# Patient Record
Sex: MALE | Race: WHITE
[De-identification: names, ages, dates, MRNs, and addresses within clinical notes are randomized per-mention and may not be internally consistent; named-entity substitution may affect disease eponyms.]

---

## 2017-11-07 VITALS — SYSTOLIC BLOOD PRESSURE: 133 MMHG | DIASTOLIC BLOOD PRESSURE: 75 MMHG

## 2017-11-07 NOTE — RAD
EXAM: Chest 2 views.



HISTORY: Chest pain, cough, congestion.



COMPARISON: None.



FINDINGS: Frontal and lateral views of the chest are obtained.    



A linear opacity in the right base most likely indicates atelectasis. The

inspiration is somewhat small. There is no pneumothorax or pleural effusion.

The heart is not enlarged.    



IMPRESSION:

1. Right basilar atelectasis. No confluent infiltrates.

## 2017-11-07 NOTE — PHYS DOC
Adult General


Chief Complaint


Chief Complaint:  COUGH





HPI


HPI





Patient is a 52 year old M who presents with cough, nasal congestion and 

shortness of breath.  Dwight states that he has had nasal congestion over the 

past 10 days and cough associated with shortness of breath over the past 3-4 

days. He states his cough is productive. He feels that his shortness of breath 

is worse with activity and improved with rest. He has associated chest pain 

with cough. His no chest pain without cough. He does have a history of 

pneumonia in his 20s. He denies any other associated symptoms.





Review of Systems


Review of Systems





Constitutional: Denies fever or chills []


Eyes: Denies change in visual acuity, redness, or eye pain []


HENT: Denies nasal congestion or sore throat []


Respiratory: Negative except history of present illness


Cardiovascular: No additional information not addressed in HPI []


GI: Denies abdominal pain, nausea, vomiting, bloody stools or diarrhea []


: Denies dysuria or hematuria []


Musculoskeletal: Denies back pain or joint pain []


Integument: Denies rash or skin lesions []


Neurologic: Denies headache, focal weakness or sensory changes []


Endocrine: Denies polyuria or polydipsia []





Family History


Family History


Noncontributory





Current Medications


Current Medications





Current Medications








 Medications


  (Trade)  Dose


 Ordered  Sig/Richardson  Start Time


 Stop Time Status Last Admin


Dose Admin


 


 Albuterol/


 Ipratropium


  (Duoneb)  3 ml  1X  ONCE  11/7/17 03:15


 11/7/17 03:16 UNV  


 


 


 Amoxicillin


  (Amoxil)  1,000 mg  1X  ONCE  11/7/17 03:15


 11/7/17 03:16 UNV  


 


 


 Budesonide


  (Pulmicort)  0.5 mg  1X  ONCE  11/7/17 03:15


 11/7/17 03:16 UNV  


 











Allergies


Allergies





Allergies








Coded Allergies Type Severity Reaction Last Updated Verified


 


  codeine Allergy Mild  11/7/17 Yes











Physical Exam


Physical Exam





Constitutional: Well developed, well nourished, no acute distress, non-toxic 

appearance. []


HENT: Normocephalic, atraumatic, bilateral external ears normal, oropharynx 

moist, no oral exudates, nose normal. []


Eyes: PERRLA, EOMI, conjunctiva normal, no discharge. [] 


Neck: Normal range of motion, no tenderness, supple, no stridor. [] 


Cardiovascular:Heart rate regular rhythm, no murmur []


Lungs & Thorax:  Bilateral breath sounds clear to auscultation [] diminished 

breath sounds bilaterally


Abdomen: Bowel sounds normal, soft, no tenderness, no masses, no pulsatile 

masses. [] 


Skin: Warm, dry, no erythema, no rash. [] 


Back: No tenderness, no CVA tenderness. [] 


Extremities: No tenderness, no cyanosis, no clubbing, ROM intact, no edema. [] 


Neurologic: Alert and oriented X 3, normal motor function, normal sensory 

function, no focal deficits noted. []


Psychologic: Affect normal, judgement normal, mood normal. []





Current Patient Data


Vital Signs


Normal vital signs. Please review nursing documentation for specifics





EKG


EKG


[]





Radiology/Procedures


Radiology/Procedures


Chest x-ray


Impressions:


Right lower lobe infiltrate





Course & Med Decision Making


Course & Med Decision Making


Pertinent Labs and Imaging studies reviewed. (See chart for details)





[]





Dragon Disclaimer


Dragon Disclaimer


This chart was dictated in whole or in part using Voice Recognition software in 

a busy, high-work load, and often noisy Emergency Department environment.  It 

may contain unintended and wholly unrecognized errors or omissions.





Departure


Departure:


Impression:  


 Primary Impression:  


 Pneumonia


 Additional Impressions:  


 Bronchitis


 Upper respiratory infection


Disposition:  01 HOME, SELF-CARE


Condition:  STABLE


Referrals:  


AIME OWENS MD (PCP)


Patient Instructions:  Acute Bronchitis, Pneumonia, Adult, Upper Respiratory 

Infection, Adult





Additional Instructions:  


Huber was seen in the emergency department for cough and congestion. No 

emergency medical condition was found on history or physical exam. He did have 

a chest x-ray with findings of pneumonia for which she was started on 

antibiotics. He was also found to have symptoms of an upper respiratory 

infection and bronchitis. He was strongly advised to consider regular nasal 

saline rinses.  He was given scripts for nasal sprays and inhaled steroids.  He 

was also given a script for albuterol to be used as needed for shortness of 

breath and/or coughing spells.  He was advised to follow up with his primary 

care doctor in the next 3-5days for further management.


Scripts


Ipratropium Bromide (IPRATROPIUM BROMIDE) 15 Ml [place]


15 ML NS TID for 7 Days, SPRAY


   Prov: SAIDA MTZ MD         11/7/17 


Fluticasone Propionate (FLOVENT 110MCG HFA) 12 Gm Aer.w.adap


2 PUFF IH BID for 7 Days, #1 INHALER 2 Refills


   Prov: SAIDA MTZ MD         11/7/17 


Albuterol Sulfate (PROAIR HFA INHALER) 8.5 Gm Hfa.aer.ad


1 PUFF INH PRN Q6HRS Y for SHORTNESS OF BREATH for 7 Days, INHALER 0 Refills


   Prov: SAIDA MTZ MD         11/7/17 


Amoxicillin (AMOXICILLIN) 500 Mg Capsule


1 CAP PO TID, #30 CAP


   Prov: SAIDA MTZ MD         11/7/17





Problem Qualifiers








 Primary Impression:  


 Pneumonia


 Pneumonia type:  due to unspecified organism  Laterality:  right  Lung location

:  lower lobe of lung  Qualified Codes:  J18.1 - Lobar pneumonia, unspecified 

organism


 Additional Impressions:  


 Upper respiratory infection


 URI type:  unspecified URI  Qualified Codes:  J06.9 - Acute upper respiratory 

infection, unspecified








SAIDA MTZ MD Nov 7, 2017 04:11

## 2017-11-07 NOTE — EKG
[facility] [address], [place] [address]

Test Date:    2017               Test Time:    02:30:00

Pat Name:     YASH SALDANA            Department:   

Patient ID:   [id number]           Room:          

Gender:       M                        Technician:   PATRICE

:          [id number]               Requested By: SAIDA MTZ

Order Number: [id number]            Reading MD:     

                                 Measurements

Intervals                              Axis          

Rate:         89                       P:            36

FL:           148                      QRS:          -13

QRSD:         94                       T:            24

QT:           358                                    

QTc:          437                                    

                           Interpretive Statements

SINUS RHYTHM

LEFTWARD AXIS

R-S TRANSITION ZONE IN V LEADS DISPLACED TO THE LEFT

INCOMPLETE RIGHT BUNDLE BRANCH BLOCK

NO SPECIFIC ECG ABNORMALITIES

RI6.01

No previous ECG available for comparison

## 2021-01-04 ENCOUNTER — HOSPITAL ENCOUNTER (OUTPATIENT)
Dept: HOSPITAL 63 - LAB | Age: 56
End: 2021-01-04
Attending: NURSE ANESTHETIST, CERTIFIED REGISTERED
Payer: COMMERCIAL

## 2021-01-04 DIAGNOSIS — Z20.828: ICD-10-CM

## 2021-01-04 DIAGNOSIS — H26.9: ICD-10-CM

## 2021-01-04 DIAGNOSIS — Z01.812: Primary | ICD-10-CM

## 2021-01-04 PROCEDURE — U0003 INFECTIOUS AGENT DETECTION BY NUCLEIC ACID (DNA OR RNA); SEVERE ACUTE RESPIRATORY SYNDROME CORONAVIRUS 2 (SARS-COV-2) (CORONAVIRUS DISEASE [COVID-19]), AMPLIFIED PROBE TECHNIQUE, MAKING USE OF HIGH THROUGHPUT TECHNOLOGIES AS DESCRIBED BY CMS-2020-01-R: HCPCS

## 2021-01-07 ENCOUNTER — HOSPITAL ENCOUNTER (OUTPATIENT)
Dept: HOSPITAL 63 - SURG | Age: 56
Discharge: HOME | End: 2021-01-07
Attending: OPHTHALMOLOGY
Payer: COMMERCIAL

## 2021-01-07 VITALS — DIASTOLIC BLOOD PRESSURE: 76 MMHG | SYSTOLIC BLOOD PRESSURE: 127 MMHG

## 2021-01-07 DIAGNOSIS — Z79.899: ICD-10-CM

## 2021-01-07 DIAGNOSIS — Z88.5: ICD-10-CM

## 2021-01-07 DIAGNOSIS — E11.36: Primary | ICD-10-CM

## 2021-01-07 DIAGNOSIS — H25.12: ICD-10-CM

## 2021-01-07 DIAGNOSIS — E11.65: ICD-10-CM

## 2021-01-07 DIAGNOSIS — Z87.01: ICD-10-CM

## 2021-01-07 DIAGNOSIS — Z98.890: ICD-10-CM

## 2021-01-07 PROCEDURE — V2632 POST CHMBR INTRAOCULAR LENS: HCPCS

## 2021-01-07 PROCEDURE — 82947 ASSAY GLUCOSE BLOOD QUANT: CPT

## 2021-01-07 PROCEDURE — 66984 XCAPSL CTRC RMVL W/O ECP: CPT

## 2021-01-07 RX ADMIN — DICLOFENAC SODIUM SCH DROP: 1 SOLUTION/ DROPS OPHTHALMIC at 07:38

## 2021-01-07 RX ADMIN — TROPICAMIDE SCH DROP: 10 SOLUTION/ DROPS OPHTHALMIC at 07:46

## 2021-01-07 RX ADMIN — TROPICAMIDE SCH DROP: 10 SOLUTION/ DROPS OPHTHALMIC at 07:38

## 2021-01-07 RX ADMIN — DICLOFENAC SODIUM SCH DROP: 1 SOLUTION/ DROPS OPHTHALMIC at 07:31

## 2021-01-07 RX ADMIN — TROPICAMIDE SCH DROP: 10 SOLUTION/ DROPS OPHTHALMIC at 07:31

## 2021-01-07 RX ADMIN — TOBRAMYCIN SCH DROP: 3 SOLUTION OPHTHALMIC at 07:31

## 2021-01-07 RX ADMIN — PHENYLEPHRINE HYDROCHLORIDE SCH DROP: 25 SOLUTION/ DROPS OPHTHALMIC at 07:46

## 2021-01-07 RX ADMIN — TOBRAMYCIN SCH DROP: 3 SOLUTION OPHTHALMIC at 07:39

## 2021-01-07 RX ADMIN — PHENYLEPHRINE HYDROCHLORIDE SCH DROP: 25 SOLUTION/ DROPS OPHTHALMIC at 07:31

## 2021-01-07 RX ADMIN — PHENYLEPHRINE HYDROCHLORIDE SCH DROP: 25 SOLUTION/ DROPS OPHTHALMIC at 07:38

## 2021-01-07 NOTE — PDOC4
SURGEON:


Regina Morales MD


Date of Procedure:   1/7/21





PREOP Diagnosis


Visually significant cataract:  Left Eye OS





POSTOP Diagnosis


Same





PROCEDURE:


Phaco w/ posterior chamber IOL:  Left Eye OS





ANESTHESIA


                  








 Deep forniceal periocular 2% Lidocaine jelly














 Alisia/retro bulbar block with 2% Lidocaine with 0.5% Marcaine











DESCRIPTION OF PROCEDURE


The risks, benefits, and alternatives were discussed with the patient who 

elected to proceed.  Informed consent was obtained in writing and placed in the 

chart  After anesthetizing the eye topically, the patient was taken to the 

operating room, and the operative eye was prepped and draped in the usual 

sterile fashion for ocular surgery.  A wire lid speculum was placed.





A 1-mm clear corneal paracentesis incision was created with the side-port blade 

at a position three o'clock hours clockwise from the temporal cornea.  Then, 1% 

non-preserved Lidocaine with epinephrine was injected into the anterior chamber 

followed by viscoelastic.  Cotton-tipped applicators were used to stabilize the 

globe, and a 2.4 mm keratome was used to create a self-sealing incision in clear

cornea at the temporal limbus.  The Utrata forceps were used to create a 

continuous curvilinear capsulorrhexis.  Balanced saline solution was injected 

via cannula beneath the capsulorrhexis edge to hydrodissect the lens nucleus and

cortex from the lens capsule.  The phacoemulsification handpiece and a chopping 

instrument were then used to remove the lens nucleus.  The remaining epinuclear 

material and cortex were removed with the irrigation/aspiration handpiece.  Visc

oelastic was used to re-inflate the lens capsule, and the intraocular lens was 

injected directly into the capsular bag.  The corneal wound edges were hydrated 

with balanced salt solution on a cannula and the irrigation/aspiration


handpiece was used to extract the remaining viscoelastic.   Cefuroxime 0.1mg/ml 

/ Vigamox 0.5% was injected into the anterior chamber intracamerally.  The 

wounds were inspected and found to be watertight at an appropriate intraocular 

pressure.  Topical antibiotic drops were placed on the corneal surface.


LRI:  No


                               


If Yes,  Number []       Axis []         Length [] degrees         Depth [] 

microns


Incision Axis:  180


               


Toric Lens Axis []


Patch/shield with Maxitrol/Tobradex/Erythromycin ointment: 








 Yes














 No 








                            


                                 


Co-managed patients/postop examination stable for co-management with referring 

doctor.











REGINA MORALES MD              Jan 7, 2021 09:18